# Patient Record
Sex: MALE | Race: WHITE | Employment: FULL TIME | URBAN - METROPOLITAN AREA
[De-identification: names, ages, dates, MRNs, and addresses within clinical notes are randomized per-mention and may not be internally consistent; named-entity substitution may affect disease eponyms.]

---

## 2017-05-27 ENCOUNTER — OFFICE VISIT (OUTPATIENT)
Dept: FAMILY MEDICINE CLINIC | Age: 27
End: 2017-05-27

## 2017-05-27 VITALS
DIASTOLIC BLOOD PRESSURE: 82 MMHG | HEART RATE: 84 BPM | HEIGHT: 73 IN | RESPIRATION RATE: 14 BRPM | WEIGHT: 212.6 LBS | SYSTOLIC BLOOD PRESSURE: 128 MMHG | TEMPERATURE: 97.5 F | BODY MASS INDEX: 28.18 KG/M2

## 2017-05-27 DIAGNOSIS — K21.9 GASTROESOPHAGEAL REFLUX DISEASE WITHOUT ESOPHAGITIS: ICD-10-CM

## 2017-05-27 DIAGNOSIS — L72.3 SEBACEOUS CYST: Primary | ICD-10-CM

## 2017-05-27 PROCEDURE — G8419 CALC BMI OUT NRM PARAM NOF/U: HCPCS | Performed by: FAMILY MEDICINE

## 2017-05-27 PROCEDURE — G8427 DOCREV CUR MEDS BY ELIG CLIN: HCPCS | Performed by: FAMILY MEDICINE

## 2017-05-27 PROCEDURE — 1036F TOBACCO NON-USER: CPT | Performed by: FAMILY MEDICINE

## 2017-05-27 PROCEDURE — 99213 OFFICE O/P EST LOW 20 MIN: CPT | Performed by: FAMILY MEDICINE

## 2017-05-27 RX ORDER — CEPHALEXIN 500 MG/1
500 CAPSULE ORAL 3 TIMES DAILY
Qty: 30 CAPSULE | Refills: 1 | Status: SHIPPED | OUTPATIENT
Start: 2017-05-27 | End: 2017-06-06

## 2017-05-27 RX ORDER — RANITIDINE 150 MG/1
150 TABLET ORAL 2 TIMES DAILY
Qty: 60 TABLET | Refills: 3 | Status: SHIPPED | OUTPATIENT
Start: 2017-05-27

## 2023-05-11 ENCOUNTER — APPOINTMENT (OUTPATIENT)
Dept: PRIMARY CARE | Facility: CLINIC | Age: 33
End: 2023-05-11
Payer: COMMERCIAL

## 2023-05-15 ENCOUNTER — APPOINTMENT (OUTPATIENT)
Dept: PRIMARY CARE | Facility: CLINIC | Age: 33
End: 2023-05-15
Payer: COMMERCIAL

## 2023-06-01 ENCOUNTER — APPOINTMENT (OUTPATIENT)
Dept: PRIMARY CARE | Facility: CLINIC | Age: 33
End: 2023-06-01
Payer: COMMERCIAL

## 2023-06-09 ENCOUNTER — APPOINTMENT (OUTPATIENT)
Dept: PRIMARY CARE | Facility: CLINIC | Age: 33
End: 2023-06-09
Payer: COMMERCIAL

## 2023-08-29 ENCOUNTER — HOSPITAL ENCOUNTER (OUTPATIENT)
Dept: DATA CONVERSION | Facility: HOSPITAL | Age: 33
End: 2023-08-29
Attending: UROLOGY | Admitting: UROLOGY
Payer: COMMERCIAL

## 2023-08-29 DIAGNOSIS — N20.2 CALCULUS OF KIDNEY WITH CALCULUS OF URETER: ICD-10-CM

## 2023-08-29 DIAGNOSIS — N35.912 UNSPECIFIED BULBOUS URETHRAL STRICTURE, MALE: ICD-10-CM

## 2023-08-29 LAB
CALCULI COMPOSITION: NORMAL
CALCULI DESCRIPTION: NORMAL
CALCULI MASS: NORMAL
CALCULI NUMBER-DATA CONVERSION: NORMAL
CALCULI SIZE-DATA CONVERSION: NORMAL

## 2023-09-01 ENCOUNTER — APPOINTMENT (OUTPATIENT)
Dept: PRIMARY CARE | Facility: CLINIC | Age: 33
End: 2023-09-01
Payer: COMMERCIAL

## 2023-09-02 LAB
CALCULI COMPOSITION: NORMAL
CALCULI DESCRIPTION: NORMAL
CALCULI MASS: 16 MG

## 2023-09-05 LAB
COMPLETE PATHOLOGY REPORT: NORMAL
CONVERTED CLINICAL DIAGNOSIS-HISTORY: NORMAL
CONVERTED FINAL DIAGNOSIS: NORMAL
CONVERTED FINAL REPORT PDF LINK TO COPY AND PASTE: NORMAL
CONVERTED GROSS DESCRIPTION: NORMAL
CONVERTED PHYSICIAN NOTIFICATION: NORMAL

## 2023-09-26 PROBLEM — M47.816 LUMBAR SPONDYLOSIS: Status: ACTIVE | Noted: 2023-09-26

## 2023-09-26 PROBLEM — N20.0 RENAL CALCULI: Status: ACTIVE | Noted: 2023-09-26

## 2023-09-26 PROBLEM — M51.26 LUMBAR DISC HERNIATION: Status: ACTIVE | Noted: 2023-09-26

## 2023-09-29 VITALS — BODY MASS INDEX: 28.55 KG/M2 | HEIGHT: 73 IN | WEIGHT: 215.39 LBS

## 2023-09-30 NOTE — H&P
History & Physical Reviewed:   I have reviewed the History and Physical dated:  29-Aug-2023   History and Physical reviewed and relevant findings noted. Patient examined to review pertinent physical  findings.: No significant changes   Home Medications Reviewed: no changes noted   Allergies Reviewed: no changes noted       ERAS (Enhanced Recovery After Surgery):  ·  ERAS Patient: no     Consent:   COVID-19 Consent:  ·  COVID-19 Risk Consent Surgeon has reviewed key risks related to the risk of ruth COVID-19 and if they contract COVID-19 what the risks are.     Attestation:   Note Completion:  I am a:  Resident/Fellow   Attending Attestation I saw and evaluated the patient.  I personally obtained the key and critical portions of the history and physical exam or was physically present for key and  critical portions performed by the resident/fellow. I reviewed the resident/fellow?s documentation and discussed the patient with the resident/fellow.  I agree with the resident/fellow?s medical decision making as documented in the note.     I personally evaluated the patient on 29-Aug-2023         Electronic Signatures:  Jenn Barroso (Resident))  (Signed 29-Aug-2023 07:01)   Authored: History & Physical Reviewed, ERAS, Consent,  Note Completion  Yusuf Welch)  (Signed 29-Aug-2023 07:43)   Authored: Note Completion   Co-Signer: History & Physical Reviewed, ERAS, Consent, Note Completion      Last Updated: 29-Aug-2023 07:43 by Yusuf Welch)

## 2023-10-01 NOTE — OP NOTE
Post Operative Note:     PreOp Diagnosis: right ureteral and kidney stones   Post-Procedure Diagnosis: same   Procedure: 1. cystoscopy  2. right retrograde pyelogram  3. right ureteroscopy, stone basketing  4. right 6x26 JJ ureteral stent insertion (on a string)   Surgeon: Yuri   Resident/Fellow/Other Assistant: Dharmesh Quiñones   Anesthesia: general   Estimated Blood Loss (mL): 1cc   Specimen: yes. stones   Complications: none   Findings: annular narrowing of the bulbar urethra  (easily traversed by scope), 3 renal stones (~2mm ea, fully treated)   Patient Returned To/Condition: PACU/Satisfactory   Urine Output: n/a   Drains and/or Catheters: right 6x26 JJ ureteral stent  (on a string)     Operative Report Dictated:  Dictation: not applicable - note contains Operative  Report   Operative Report:    Operative indications: Patient has a history of recurrent nephrolithiasis, prior ESWL x2 was found to have persistent nephrolithiasis on the CT scan 8/7/2023.  CT  revealed right distal ureteral stones x2 as well as right lower pole stones x2-3.  In light of this, it was recommended that he proceed with ureteroscopy and laser lithotripsy.  Signed consent was obtained.    Operative findings:  -Patient had cleared his right ureteral stones.  3 stones identified in the mid and lower poles, all basket extracted, each approximately 4 mm in size.  Stone free at the conclusion.    Operative course: Patient consented to procedure in preoperative area. Risks and benefits discussed. Patient was marked on the right side. Allergies were reviewed and preoperative antibiotics were administered. Patient was brought to the operating room  and placed in supine position on the operating room table. A timeout was performed. All were in agreement. Patient underwent general anesthesia without complication. They were repositioned in dorsal lithotomy and prepped and draped in the usual sterile  fashion. A 21 fr rigid cystoscope was used  to perform cystourethroscopy. Annular narrowing was noted in the bulbar urethra but was easily traversed by scope. Urethra was otherwise normal. Bilateral UOs were in normal orthotopic position. No masses or  stones were identified.  Through the right UO, a sensor wire was placed through a 5Fr catheter to the level of the kidney as confirmed on fluoroscopy. Retrograde pyelogram was performed.     Retrograde pyelogram interpretation: Ureter had normal caliber and course. No obvious filling defects were noted    A 4.5/6Fr Moore semirigid ureteroscope was then advanced alongside the wire to assess the ureter. No stones were noted.    A second wire was advanced to the kidney through the semi-rigid scope before scope was removed. One wire was secured as a safety wire. A 11/13Fr x 46cm ureteral access sheath was advanced over the second wire under fluoro. Wire and inner lumen were removed.  Pan pyeloscopy was performed.  Nephrolithiasis was noted in the upper pole calyces with 3 very small (2mm ea) seen and removed entirely using the ureteroscopic basket. Pyeloscopy was repeated to confirm no large stone fragments remained. Ureteral access  sheath was withdrawn under direct visualization. No ureteral stones were noted on ureteroscopy. A 6x 26JJ stent on a string was placed over the safety wire with proximal curl noted in kidney on fluoro and distal curl in the bladder on direct visualization.  Bladder was drained, instruments removed, string was secured to the phallus with mastisol and tegaderm. This concluded the procedure. Patient was awoken from anesthesia without complication and transferred to PACU in stable condition.      Attestation:   Note Completion:  I am a: Resident/Fellow   Attending Attestation I was present for key portions of the procedure and the procedure lasted longer than 5 minutes.          Electronic Signatures:  Jenn Barroso (MD (Resident))  (Signed 29-Aug-2023 09:04)   Authored: Post Operative  Note, Note Completion  Yusuf Welch)  (Signed 29-Aug-2023 09:27)   Authored: Post Operative Note, Note Completion   Co-Signer: Post Operative Note, Note Completion      Last Updated: 29-Aug-2023 09:27 by Yusuf Welch)

## 2023-11-20 ENCOUNTER — APPOINTMENT (OUTPATIENT)
Dept: UROLOGY | Facility: CLINIC | Age: 33
End: 2023-11-20
Payer: COMMERCIAL

## 2023-11-27 ENCOUNTER — HOSPITAL ENCOUNTER (EMERGENCY)
Facility: HOSPITAL | Age: 33
Discharge: HOME | End: 2023-11-27
Attending: STUDENT IN AN ORGANIZED HEALTH CARE EDUCATION/TRAINING PROGRAM
Payer: COMMERCIAL

## 2023-11-27 ENCOUNTER — APPOINTMENT (OUTPATIENT)
Dept: RADIOLOGY | Facility: HOSPITAL | Age: 33
End: 2023-11-27
Payer: COMMERCIAL

## 2023-11-27 VITALS
DIASTOLIC BLOOD PRESSURE: 108 MMHG | WEIGHT: 205 LBS | HEIGHT: 72 IN | SYSTOLIC BLOOD PRESSURE: 140 MMHG | BODY MASS INDEX: 27.77 KG/M2 | RESPIRATION RATE: 18 BRPM | HEART RATE: 100 BPM | TEMPERATURE: 97.7 F | OXYGEN SATURATION: 100 %

## 2023-11-27 DIAGNOSIS — J06.9 VIRAL UPPER RESPIRATORY TRACT INFECTION: Primary | ICD-10-CM

## 2023-11-27 LAB
FLUAV RNA RESP QL NAA+PROBE: NOT DETECTED
FLUBV RNA RESP QL NAA+PROBE: NOT DETECTED
GLUCOSE BLD MANUAL STRIP-MCNC: 100 MG/DL (ref 74–99)
RSV RNA RESP QL NAA+PROBE: NOT DETECTED
SARS-COV-2 RNA RESP QL NAA+PROBE: NOT DETECTED

## 2023-11-27 PROCEDURE — 71046 X-RAY EXAM CHEST 2 VIEWS: CPT | Mod: FY,FR

## 2023-11-27 PROCEDURE — 87634 RSV DNA/RNA AMP PROBE: CPT | Performed by: EMERGENCY MEDICINE

## 2023-11-27 PROCEDURE — 71046 X-RAY EXAM CHEST 2 VIEWS: CPT | Mod: COMPUTED RADIOGRAPHY X-RAY | Performed by: RADIOLOGY

## 2023-11-27 PROCEDURE — 82947 ASSAY GLUCOSE BLOOD QUANT: CPT

## 2023-11-27 PROCEDURE — 87636 SARSCOV2 & INF A&B AMP PRB: CPT | Performed by: STUDENT IN AN ORGANIZED HEALTH CARE EDUCATION/TRAINING PROGRAM

## 2023-11-27 PROCEDURE — 99283 EMERGENCY DEPT VISIT LOW MDM: CPT | Mod: 25 | Performed by: STUDENT IN AN ORGANIZED HEALTH CARE EDUCATION/TRAINING PROGRAM

## 2023-11-27 PROCEDURE — 94760 N-INVAS EAR/PLS OXIMETRY 1: CPT

## 2023-11-27 PROCEDURE — 99284 EMERGENCY DEPT VISIT MOD MDM: CPT | Performed by: STUDENT IN AN ORGANIZED HEALTH CARE EDUCATION/TRAINING PROGRAM

## 2023-11-27 ASSESSMENT — ENCOUNTER SYMPTOMS
NUMBNESS: 0
CONSTIPATION: 0
FEVER: 1
NECK PAIN: 0
BACK PAIN: 0
ABDOMINAL DISTENTION: 0
HEMATURIA: 0
FREQUENCY: 0
SHORTNESS OF BREATH: 0
HEADACHES: 0
PALPITATIONS: 0
CHEST TIGHTNESS: 0
COUGH: 1
FATIGUE: 1
RHINORRHEA: 1
VOMITING: 0
CHILLS: 1
DIAPHORESIS: 1
FLANK PAIN: 0
SORE THROAT: 1
DIFFICULTY URINATING: 0
ABDOMINAL PAIN: 0
DYSURIA: 0
BLOOD IN STOOL: 0
WOUND: 0
APPETITE CHANGE: 1
DIARRHEA: 0
NAUSEA: 1

## 2023-11-27 ASSESSMENT — LIFESTYLE VARIABLES
HAVE PEOPLE ANNOYED YOU BY CRITICIZING YOUR DRINKING: NO
REASON UNABLE TO ASSESS: NO
EVER HAD A DRINK FIRST THING IN THE MORNING TO STEADY YOUR NERVES TO GET RID OF A HANGOVER: NO
HAVE YOU EVER FELT YOU SHOULD CUT DOWN ON YOUR DRINKING: NO
EVER FELT BAD OR GUILTY ABOUT YOUR DRINKING: NO

## 2023-11-27 ASSESSMENT — COLUMBIA-SUICIDE SEVERITY RATING SCALE - C-SSRS
2. HAVE YOU ACTUALLY HAD ANY THOUGHTS OF KILLING YOURSELF?: NO
6. HAVE YOU EVER DONE ANYTHING, STARTED TO DO ANYTHING, OR PREPARED TO DO ANYTHING TO END YOUR LIFE?: NO
1. IN THE PAST MONTH, HAVE YOU WISHED YOU WERE DEAD OR WISHED YOU COULD GO TO SLEEP AND NOT WAKE UP?: NO

## 2023-11-27 ASSESSMENT — PAIN - FUNCTIONAL ASSESSMENT: PAIN_FUNCTIONAL_ASSESSMENT: 0-10

## 2023-11-27 ASSESSMENT — PAIN SCALES - GENERAL: PAINLEVEL_OUTOF10: 0 - NO PAIN

## 2023-11-27 NOTE — DISCHARGE INSTRUCTIONS
Please return to the ER or seek immediate medical attention if you experience new or worsening chest pain, shortness of breath, fever of 38C (100.4) or higher, persistent vomiting, weakness, numbness, tingling, excessive sweating,  loss of motion in your arms or legs, fainting, vision changes, or any new or worsening symptoms.    You are welcome back any time. Thank you for entrusting your care to us, I hope we made your visit as pleasant as possible. Wishing you well!    Dr. Calhoun

## 2023-11-27 NOTE — ED PROVIDER NOTES
EMERGENCY DEPARTMENT ENCOUNTER      Pt Name: Scott Harry  MRN: [id number]  Birthdate 1990  Date of evaluation: 11/27/2023  Provider: Von Calhoun DO    HPI   CC:  Chief Complaint   Patient presents with    URI       HPI  Scott Harry is a 33 y.o. year old male who presents to the ER for fever.  He states that his symptoms started Tuesday with malaise, Thursday noted temperature of 101 measured by forehead, since then has been using acetaminophen and ibuprofen to manage fever.  He states that medications are working however every 6 hours fever returns when meds wear off.  Associated symptoms include chills, diaphoresis, loss of appetite, congestion, rhinorrhea, sore throat, cough, nausea without vomiting.  Denies chest pain, shortness of breath, abdominal pain, changes to bowel or bladder habits.  He states symptoms have been improving over the past few days, he is primarily concerned regarding duration of symptoms.      Review of Systems   Review of Systems   Constitutional:  Positive for appetite change, chills, diaphoresis, fatigue and fever.   HENT:  Positive for congestion, rhinorrhea and sore throat. Negative for tinnitus.    Eyes:  Negative for visual disturbance.   Respiratory:  Positive for cough. Negative for chest tightness and shortness of breath.    Cardiovascular:  Negative for chest pain and palpitations.   Gastrointestinal:  Positive for nausea. Negative for abdominal distention, abdominal pain, blood in stool (hematochezia or melena), constipation, diarrhea and vomiting.   Endocrine: Negative for polyuria.   Genitourinary:  Negative for decreased urine volume, difficulty urinating, dysuria, flank pain, frequency, hematuria and urgency.   Musculoskeletal:  Negative for back pain and neck pain.   Skin:  Negative for rash and wound.   Neurological:  Negative for numbness and headaches.       Patient History     PAST MEDICAL HISTORY     Past Medical History:   Diagnosis Date    Other  conditions influencing health status     No significant past medical history       SURGICAL HISTORY       Past Surgical History:   Procedure Laterality Date    OTHER SURGICAL HISTORY  01/28/2020    [place] tooth extraction       ALLERGIES     Patient has no known allergies.  No Known Allergies    FAMILY HISTORY     No family history on file.     SOCIAL HISTORY       Social History     Tobacco Use    Smoking status: Not on file    Smokeless tobacco: Not on file   Substance Use Topics    Alcohol use: Not on file    Drug use: Not on file       PHYSICAL EXAM   Physical Exam   ED Triage Vitals [11/27/23 0948]   Temp Heart Rate Resp BP   36.5 °C (97.7 °F) 98 18 155/90      SpO2 Temp Source Heart Rate Source Patient Position   100 % Temporal Monitor Sitting      BP Location FiO2 (%)     Right arm --       Physical Exam  Vitals and nursing note reviewed.   Constitutional:       General: He is not in acute distress.     Appearance: Normal appearance. He is not ill-appearing.   HENT:      Head: Atraumatic. No contusion or laceration.      Jaw: No trismus or malocclusion.      Right Ear: External ear normal.      Left Ear: External ear normal.      Nose: Congestion present.      Comments: No sinus TTP     Mouth/Throat:      Mouth: Mucous membranes are moist.      Pharynx: Oropharynx is clear.   Eyes:      Extraocular Movements: Extraocular movements intact.      Pupils: Pupils are equal, round, and reactive to light.   Neck:      Trachea: No tracheal deviation.   Cardiovascular:      Rate and Rhythm: Normal rate and regular rhythm.      Pulses: Normal pulses.   Pulmonary:      Effort: Pulmonary effort is normal. No respiratory distress.      Breath sounds: Normal breath sounds. No wheezing, rhonchi or rales.   Chest:      Chest wall: No tenderness.   Abdominal:      General: Abdomen is flat. There is no distension.      Palpations: Abdomen is soft. There is no mass.      Tenderness: There is no abdominal tenderness. There is no  right CVA tenderness or left CVA tenderness.   Musculoskeletal:         General: No tenderness or signs of injury.      Cervical back: Normal range of motion. No tenderness.      Right lower leg: No edema.      Left lower leg: No edema.   Skin:     Coloration: Skin is not jaundiced or pale.      Findings: No rash or wound.   Neurological:      General: No focal deficit present.      Mental Status: He is alert. Mental status is at baseline.   Psychiatric:         Speech: Speech normal.         Behavior: Behavior normal.         Thought Content: Thought content does not include homicidal or suicidal ideation.         Judgment: Judgment normal.         DIAGNOSTIC RESULTS   RADIOLOGY:   Non-plain film images such as CT, Ultrasound and MRI are read by the radiologist. Plain radiographic images are visualized and preliminarily interpreted by the emergency physician with the below findings:    Interpretation of chest x-ray no acute cardiopulmonary pathology visualized.     Interpretation per the Radiologist below, if available at the time of this note:    XR chest 2 views   Final Result   No acute thoracic findings.   Signed by Charly Mao II, MD            ED BEDSIDE ULTRASOUND:   Performed by ED Physician - none    LABS:  Labs Reviewed   POCT GLUCOSE - Abnormal       Result Value    POCT Glucose 100 (*)    SARS-COV-2 PCR, SYMPTOMATIC - Normal    Coronavirus 2019, PCR Not Detected      Narrative:     This assay has received FDA Emergency Use Authorization (EUA) and is only authorized for the duration of time that circumstances exist to justify the authorization of the emergency use of in vitro diagnostic tests for the detection of SARS-CoV-2 virus and/or diagnosis of COVID-19 infection under section 564(b)(1) of the Act, 21 U.S.C. 360bbb-3(b)(1). This assay is an in vitro diagnostic nucleic acid amplification test for the qualitative detection of SARS-CoV-2 from nasopharyngeal specimens and has been validated for use at  [facility]. Negative results do not preclude COVID-19 infections and should not be used as the sole basis for diagnosis, treatment, or other management decisions.     INFLUENZA A AND B PCR - Normal    Flu A Result Not Detected      Flu B Result Not Detected      Narrative:     This assay is an in vitro diagnostic multiplex nucleic acid amplification test for the detection and discrimination of Influenza A & B from nasopharyngeal specimens, and has been validated for use at [facility]. Negative results do not preclude Influenza A/B infections, and should not be used as the sole basis for diagnosis, treatment, or other management decisions. If Influenza A/B and RSV PCR results are negative, testing for Parainfluenza virus, Adenovirus and Metapneumovirus is routinely performed for [facility] pediatric oncology and intensive care inpatients, and is available on other patients by placing an add-on request.   RSV PCR - Normal    RSV PCR Not Detected      Narrative:     This assay is an FDA-cleared, in vitro diagnostic nucleic acid amplification test for the detection of RSV from nasopharyngeal specimens, and has been validated for use at [facility]. Negative results do not preclude RSV infections, and should not be used as the sole basis for diagnosis, treatment, or other management decisions. If Influenza A/B and RSV PCR results are negative, testing for Parainfluenza virus, Adenovirus and Metapneumovirus is routinely performed for pediatric oncology and intensive care inpatients at [facility], and is available on other patients by placing an add-on request.       POCT GLUCOSE METER       All other labs were within normal range or not returned as of this dictation.      EMERGENCY DEPARTMENT COURSE/MDM:   ED Course & MDM   ED Course as of 11/27/23 1424   Mon Nov 27, 2023   1144 Doesn't have PCP, will require one at discharge. [CB]   1325 POCT Glucose(!): 100  No  hypoglycemia. Viral swabs negative.  [CB]      ED Course User Index  [CB] Von Calhoun DO         Diagnoses as of 11/27/23 1424   Viral upper respiratory tract infection     Vitals:    Vitals:    11/27/23 0948 11/27/23 1341   BP: 155/90 (!) 140/108   BP Location: Right arm Left arm   Patient Position: Sitting Sitting   Pulse: 98 100   Resp: 18 18   Temp: 36.5 °C (97.7 °F)    TempSrc: Temporal    SpO2: 100% 100%   Weight: 93 kg (205 lb)    Height: 1.829 m (6')      Scott Harry is a male 33 y.o. who presents to the ER for   Chief Complaint   Patient presents with    URI   . On arrival the patients vital signs were: Afebrile, Reguar HR, Normotensive, Regular RR, and Oxygenating well on room air. History obtained from: patient.  33-year-old male presents to the ED with nearly 1 week of fever.  After shared decision making conservative workup initiated, limited to viral swabs, POC glucose, and 2 view CXR. No interventions provided. Viral swabs negative for covid/flu/RSV. Glucose WNL, no hypo or hyerglycemia. On CXR no acute cardiopulmonary pathology noted.    Diagnostic testing considered but not performed: Blood and urine labs including CBC, CMP, UA to assess for hepatorenal/electrolyte insufficiency or other signs of disseminated infection.  Social Determinants Affecting Care: Pt requested new PCP, contact information provided.    Shared decision making for disposition  Patient and/or patient´s representative was counseled regarding labs, imaging, likely diagnosis. All questions were answered. Recommendation was made   for discharge home. The patient agreed and was discharged home in stable condition with appropriate relevant educational materials. Return precautions were provided which included new or worsening chest pain, shortness of breath, fever of 38C (100.4) or higher, persistent vomiting, weakness, numbness, tingling, excessive sweating,  loss of motion in your arms or legs, fainting, vision changes, or  any new or worsening symptoms..       ED Medications administered this visit:  Medications - No data to display    New Prescriptions from this visit:    There are no discharge medications for this patient.      Follow-up:  Mari Aviles MD  [address]  [address]  [place] [address] [phone]    Schedule an appointment as soon as possible for a visit       Torrey Darby DO  [address] [address] [phone]    Schedule an appointment as soon as possible for a visit           Final Impression:   1. Viral upper respiratory tract infection          I reviewed the case with the attending ED physician. The attending ED physician agrees with the plan.   Von Calhoun DO  PGY-2  Emergency Medicine      Please excuse any misspellings or unintended errors related to the Dragon speech recognition software used to dictate this note.       Von Calhoun DO  Resident  11/27/23 1424       Von Calhoun DO  Resident  11/27/23 142

## 2024-07-13 ENCOUNTER — APPOINTMENT (OUTPATIENT)
Dept: CARDIOLOGY | Facility: HOSPITAL | Age: 34
End: 2024-07-13
Payer: COMMERCIAL

## 2024-07-13 ENCOUNTER — HOSPITAL ENCOUNTER (EMERGENCY)
Facility: HOSPITAL | Age: 34
Discharge: HOME | End: 2024-07-13
Attending: EMERGENCY MEDICINE
Payer: COMMERCIAL

## 2024-07-13 ENCOUNTER — APPOINTMENT (OUTPATIENT)
Dept: RADIOLOGY | Facility: HOSPITAL | Age: 34
End: 2024-07-13
Payer: COMMERCIAL

## 2024-07-13 VITALS
OXYGEN SATURATION: 98 % | TEMPERATURE: 97.9 F | WEIGHT: 190 LBS | BODY MASS INDEX: 25.73 KG/M2 | HEART RATE: 84 BPM | HEIGHT: 72 IN | DIASTOLIC BLOOD PRESSURE: 86 MMHG | SYSTOLIC BLOOD PRESSURE: 149 MMHG | RESPIRATION RATE: 16 BRPM

## 2024-07-13 DIAGNOSIS — R55 SYNCOPE, UNSPECIFIED SYNCOPE TYPE: Primary | ICD-10-CM

## 2024-07-13 LAB
ALBUMIN SERPL BCP-MCNC: 4.4 G/DL (ref 3.4–5)
ALP SERPL-CCNC: 44 U/L (ref 33–120)
ALT SERPL W P-5'-P-CCNC: 15 U/L (ref 10–52)
AMPHETAMINES UR QL SCN: ABNORMAL
ANION GAP SERPL CALC-SCNC: 13 MMOL/L (ref 10–20)
AST SERPL W P-5'-P-CCNC: 14 U/L (ref 9–39)
BARBITURATES UR QL SCN: ABNORMAL
BASOPHILS # BLD AUTO: 0.1 X10*3/UL (ref 0–0.1)
BASOPHILS NFR BLD AUTO: 0.8 %
BENZODIAZ UR QL SCN: ABNORMAL
BILIRUB SERPL-MCNC: 0.5 MG/DL (ref 0–1.2)
BUN SERPL-MCNC: 19 MG/DL (ref 6–23)
BZE UR QL SCN: ABNORMAL
CALCIUM SERPL-MCNC: 9 MG/DL (ref 8.6–10.3)
CANNABINOIDS UR QL SCN: ABNORMAL
CARDIAC TROPONIN I PNL SERPL HS: 3 NG/L (ref 0–20)
CHLORIDE SERPL-SCNC: 103 MMOL/L (ref 98–107)
CO2 SERPL-SCNC: 23 MMOL/L (ref 21–32)
CREAT SERPL-MCNC: 1.13 MG/DL (ref 0.5–1.3)
EGFRCR SERPLBLD CKD-EPI 2021: 87 ML/MIN/1.73M*2
EOSINOPHIL # BLD AUTO: 0.34 X10*3/UL (ref 0–0.7)
EOSINOPHIL NFR BLD AUTO: 2.6 %
ERYTHROCYTE [DISTWIDTH] IN BLOOD BY AUTOMATED COUNT: 12.5 % (ref 11.5–14.5)
ETHANOL SERPL-MCNC: <10 MG/DL
FENTANYL+NORFENTANYL UR QL SCN: ABNORMAL
GLUCOSE SERPL-MCNC: 133 MG/DL (ref 74–99)
HCT VFR BLD AUTO: 43.8 % (ref 41–52)
HGB BLD-MCNC: 14.9 G/DL (ref 13.5–17.5)
IMM GRANULOCYTES # BLD AUTO: 0.07 X10*3/UL (ref 0–0.7)
IMM GRANULOCYTES NFR BLD AUTO: 0.5 % (ref 0–0.9)
INR PPP: 1.2 (ref 0.9–1.1)
LYMPHOCYTES # BLD AUTO: 3.79 X10*3/UL (ref 1.2–4.8)
LYMPHOCYTES NFR BLD AUTO: 29.4 %
MCH RBC QN AUTO: 30 PG (ref 26–34)
MCHC RBC AUTO-ENTMCNC: 34 G/DL (ref 32–36)
MCV RBC AUTO: 88 FL (ref 80–100)
METHADONE UR QL SCN: ABNORMAL
MONOCYTES # BLD AUTO: 1.08 X10*3/UL (ref 0.1–1)
MONOCYTES NFR BLD AUTO: 8.4 %
NEUTROPHILS # BLD AUTO: 7.49 X10*3/UL (ref 1.2–7.7)
NEUTROPHILS NFR BLD AUTO: 58.3 %
NRBC BLD-RTO: 0 /100 WBCS (ref 0–0)
OPIATES UR QL SCN: ABNORMAL
OXYCODONE+OXYMORPHONE UR QL SCN: ABNORMAL
PCP UR QL SCN: ABNORMAL
PLATELET # BLD AUTO: 299 X10*3/UL (ref 150–450)
POTASSIUM SERPL-SCNC: 3.3 MMOL/L (ref 3.5–5.3)
PROT SERPL-MCNC: 7.4 G/DL (ref 6.4–8.2)
PROTHROMBIN TIME: 13.9 SECONDS (ref 9.8–12.8)
RBC # BLD AUTO: 4.96 X10*6/UL (ref 4.5–5.9)
SODIUM SERPL-SCNC: 136 MMOL/L (ref 136–145)
WBC # BLD AUTO: 12.9 X10*3/UL (ref 4.4–11.3)

## 2024-07-13 PROCEDURE — 93010 ELECTROCARDIOGRAM REPORT: CPT | Performed by: EMERGENCY MEDICINE

## 2024-07-13 PROCEDURE — 82077 ASSAY SPEC XCP UR&BREATH IA: CPT | Performed by: EMERGENCY MEDICINE

## 2024-07-13 PROCEDURE — 99285 EMERGENCY DEPT VISIT HI MDM: CPT | Performed by: EMERGENCY MEDICINE

## 2024-07-13 PROCEDURE — 36415 COLL VENOUS BLD VENIPUNCTURE: CPT | Performed by: STUDENT IN AN ORGANIZED HEALTH CARE EDUCATION/TRAINING PROGRAM

## 2024-07-13 PROCEDURE — 93005 ELECTROCARDIOGRAM TRACING: CPT

## 2024-07-13 PROCEDURE — 84484 ASSAY OF TROPONIN QUANT: CPT | Performed by: EMERGENCY MEDICINE

## 2024-07-13 PROCEDURE — 71045 X-RAY EXAM CHEST 1 VIEW: CPT | Performed by: STUDENT IN AN ORGANIZED HEALTH CARE EDUCATION/TRAINING PROGRAM

## 2024-07-13 PROCEDURE — 36415 COLL VENOUS BLD VENIPUNCTURE: CPT | Performed by: EMERGENCY MEDICINE

## 2024-07-13 PROCEDURE — 99283 EMERGENCY DEPT VISIT LOW MDM: CPT | Mod: 25

## 2024-07-13 PROCEDURE — 85610 PROTHROMBIN TIME: CPT | Performed by: EMERGENCY MEDICINE

## 2024-07-13 PROCEDURE — 85025 COMPLETE CBC W/AUTO DIFF WBC: CPT | Performed by: EMERGENCY MEDICINE

## 2024-07-13 PROCEDURE — 80307 DRUG TEST PRSMV CHEM ANLYZR: CPT | Performed by: EMERGENCY MEDICINE

## 2024-07-13 PROCEDURE — 71045 X-RAY EXAM CHEST 1 VIEW: CPT

## 2024-07-13 PROCEDURE — 80053 COMPREHEN METABOLIC PANEL: CPT | Performed by: EMERGENCY MEDICINE

## 2024-07-13 PROCEDURE — 85025 COMPLETE CBC W/AUTO DIFF WBC: CPT | Performed by: STUDENT IN AN ORGANIZED HEALTH CARE EDUCATION/TRAINING PROGRAM

## 2024-07-13 PROCEDURE — 2500000004 HC RX 250 GENERAL PHARMACY W/ HCPCS (ALT 636 FOR OP/ED): Performed by: EMERGENCY MEDICINE

## 2024-07-13 RX ADMIN — SODIUM CHLORIDE 1000 ML: 9 INJECTION, SOLUTION INTRAVENOUS at 22:41

## 2024-07-13 ASSESSMENT — COLUMBIA-SUICIDE SEVERITY RATING SCALE - C-SSRS
6. HAVE YOU EVER DONE ANYTHING, STARTED TO DO ANYTHING, OR PREPARED TO DO ANYTHING TO END YOUR LIFE?: NO
1. IN THE PAST MONTH, HAVE YOU WISHED YOU WERE DEAD OR WISHED YOU COULD GO TO SLEEP AND NOT WAKE UP?: NO
2. HAVE YOU ACTUALLY HAD ANY THOUGHTS OF KILLING YOURSELF?: NO

## 2024-07-13 ASSESSMENT — LIFESTYLE VARIABLES
TOTAL SCORE: 0
EVER FELT BAD OR GUILTY ABOUT YOUR DRINKING: NO
HAVE YOU EVER FELT YOU SHOULD CUT DOWN ON YOUR DRINKING: NO
HAVE PEOPLE ANNOYED YOU BY CRITICIZING YOUR DRINKING: NO
EVER HAD A DRINK FIRST THING IN THE MORNING TO STEADY YOUR NERVES TO GET RID OF A HANGOVER: NO

## 2024-07-13 ASSESSMENT — PAIN - FUNCTIONAL ASSESSMENT: PAIN_FUNCTIONAL_ASSESSMENT: 0-10

## 2024-07-13 ASSESSMENT — PAIN SCALES - GENERAL
PAINLEVEL_OUTOF10: 0 - NO PAIN
PAINLEVEL_OUTOF10: 0 - NO PAIN

## 2024-07-14 LAB — HOLD SPECIMEN: NORMAL

## 2024-07-14 NOTE — DISCHARGE INSTRUCTIONS
Hydrate well over next few days.  Any further episodes of fainting or even close to fainting or any seizure activity should prompt immediate return to the ER.

## 2024-07-14 NOTE — ED PROVIDER NOTES
HPI   Chief Complaint   Patient presents with    Syncope       33-year-old male presents with wife after 2 episodes of syncope while sitting down.  He has been ill for the last 4 to 5 days with nausea and vomiting.  No diarrhea.  Admits his p.o. intake has been less than usual.  He had 2 beers today and limited marijuana.  While sitting down his wife noticed him twice for just brief moments each time sort of leaned forward and like he wanted to faint.  No clear seizure activity.  No biting of his tongue or bowel bladder problems.  He was diagnosed with 1 episode of seizure years ago when he was in college.  No medications were further problems have occurred.  He feels well at this time.      History provided by:  Patient and spouse                      Cristal Coma Scale Score: 15                     Patient History   Past Medical History:   Diagnosis Date    Other conditions influencing health status     No significant past medical history     Past Surgical History:   Procedure Laterality Date    OTHER SURGICAL HISTORY  01/28/2020    [place] tooth extraction     No family history on file.  Social History     Tobacco Use    Smoking status: Never    Smokeless tobacco: Never   Vaping Use    Vaping status: Never Used   Substance Use Topics    Alcohol use: Yes     Alcohol/week: 2.0 standard drinks of alcohol     Types: 2 Cans of beer per week    Drug use: Never       Physical Exam   ED Triage Vitals [07/13/24 2129]   Temperature Heart Rate Respirations BP   36.6 °C (97.9 °F) 83 16 133/86      Pulse Ox Temp Source Heart Rate Source Patient Position   97 % Temporal Monitor Lying      BP Location FiO2 (%)     Right arm --       Physical Exam  Vitals and nursing note reviewed.   Constitutional:       General: He is not in acute distress.     Appearance: Normal appearance. He is not ill-appearing, toxic-appearing or diaphoretic.   HENT:      Head: Normocephalic and atraumatic.      Comments: No bite marks     Mouth/Throat:       Mouth: Mucous membranes are moist.   Cardiovascular:      Rate and Rhythm: Normal rate and regular rhythm.      Heart sounds: Normal heart sounds.   Pulmonary:      Effort: Pulmonary effort is normal.      Breath sounds: Normal breath sounds.   Abdominal:      Palpations: Abdomen is soft.      Tenderness: There is no abdominal tenderness. There is no guarding or rebound.   Musculoskeletal:         General: No tenderness. Normal range of motion.      Cervical back: Normal range of motion and neck supple. No tenderness.   Skin:     General: Skin is warm and dry.   Neurological:      General: No focal deficit present.      Mental Status: He is alert and oriented to person, place, and time.      GCS: GCS eye subscore is 4. GCS verbal subscore is 5. GCS motor subscore is 6.      Cranial Nerves: Cranial nerves 2-12 are intact. No cranial nerve deficit.      Sensory: Sensation is intact. No sensory deficit.      Motor: Motor function is intact. No weakness.      Coordination: Coordination is intact. Coordination normal.      Gait: Gait normal.      Comments: Nih score zero         ED Course & MDM   Diagnoses as of 07/13/24 3473   Syncope, unspecified syncope type       Medical Decision Making  33-year-old male who had 2 brief syncopal type events while sitting down.  Has been drinking today.  Had some gastric type symptoms last 4 to 5 days.  Not hydrating well.  Looks well here.  Neurological deficits not present.  Orthostatic blood pressure good.  Vitals good.  Will be discharged home.  Patient and wife understand any further episodes or any new concerns or symptoms should prompt immediate return to receive another evaluation.    Amount and/or Complexity of Data Reviewed  Labs: ordered. Decision-making details documented in ED Course.  Radiology: independent interpretation performed.     Details: Chest x-ray without acute findings to my interpretation.  Official read pending.  ECG/medicine tests: ordered and independent  interpretation performed. Decision-making details documented in ED Course.     Details: Hr 89, pr 0.17, qrs 0.1,  qtc 0.412, axis nml, no st changes.    EKG #2, normal sinus, heart rate 84, , , QTc of 453, axis normal.  No concerning ST changes.        Procedure  Procedures     Horace Ventura MD  07/14/24 0105

## 2024-07-20 LAB
ATRIAL RATE: 84 BPM
P AXIS: 65 DEGREES
P OFFSET: 181 MS
P ONSET: 128 MS
PR INTERVAL: 174 MS
Q ONSET: 215 MS
QRS COUNT: 13 BEATS
QRS DURATION: 108 MS
QT INTERVAL: 384 MS
QTC CALCULATION(BAZETT): 453 MS
QTC FREDERICIA: 429 MS
R AXIS: 46 DEGREES
T AXIS: 62 DEGREES
T OFFSET: 407 MS
VENTRICULAR RATE: 84 BPM

## 2024-10-21 ENCOUNTER — APPOINTMENT (OUTPATIENT)
Dept: PRIMARY CARE | Facility: CLINIC | Age: 34
End: 2024-10-21
Payer: COMMERCIAL

## 2024-10-23 NOTE — PROGRESS NOTES
Subjective   Patient ID: Scott Harry is a 34 y.o. male who presents for No chief complaint on file..  HPI  Annual physical   Eats a generally healthy diet   Exercises   Denies any chest pain,SOB  No Abdominal pain   No black or bloody stools   Urination/BM normal   Last eye apt  Last dental apt     Flu     Review of systems  ; Patient seen today for exam denies any problems with headaches or vision, denies any shortness of breath chest pain nausea or vomiting, no black stool no blood in the stool no heartburn type symptoms denies any problems with constipation or diarrhea, and no dysuria-type symptoms    The patient's allergies medications were reviewed with them today    The patient's social family and surgical history or also reviewed here today, along with her past medical history.     Objective     Alert and active in  no acute distress  HEENT TMs clear oropharynx negative nares clear no drainage noted neck supple  With no adenopathy   Heart regular rate and rhythm without murmur and no carotid bruits  Lungs- clear to auscultation bilaterally, no wheeze or rhonchi noted  Thyroid -negative masses or nodularity  Abdomen- soft times four quadrants , bowel sounds positive no masses or organomegaly, negative tenderness guarding or rebound  Neurological exam unremarkable- DTRs in upper and lower extremities within normal limits.   skin -no lesions noted      There were no vitals taken for this visit.    Allergies   Allergen Reactions   • Coconut Anaphylaxis, Angioedema and Hives     coconut    anaphylactic       Assessment/Plan   Problem List Items Addressed This Visit    None  Visit Diagnoses       Lipid screening        Routine general medical examination at a health care facility                  If anything worsens or changes please call us at once, follow up in the office as planned,

## 2024-10-24 ENCOUNTER — APPOINTMENT (OUTPATIENT)
Dept: PRIMARY CARE | Facility: CLINIC | Age: 34
End: 2024-10-24
Payer: COMMERCIAL

## 2024-10-24 DIAGNOSIS — Z13.220 LIPID SCREENING: ICD-10-CM

## 2024-10-24 DIAGNOSIS — Z00.00 ROUTINE GENERAL MEDICAL EXAMINATION AT A HEALTH CARE FACILITY: ICD-10-CM

## 2024-10-30 ENCOUNTER — APPOINTMENT (OUTPATIENT)
Dept: PRIMARY CARE | Facility: CLINIC | Age: 34
End: 2024-10-30
Payer: COMMERCIAL

## 2024-11-15 ENCOUNTER — APPOINTMENT (OUTPATIENT)
Dept: PRIMARY CARE | Facility: CLINIC | Age: 34
End: 2024-11-15
Payer: COMMERCIAL

## 2024-11-15 NOTE — PROGRESS NOTES
Subjective   Patient ID: Scott Harry is a 34 y.o. male who presents for No chief complaint on file..  HPI    Annual physical   Eats/does not eat a generally healthy diet   Exercises ***  Denies any chest pain,SOB  No Abdominal pain   No black or bloody stools   Urination/BM normal   Last eye apt ***  Last dental apt ***  No new family h/o cancers or heart disease                 Review of systems  ; Patient seen today for exam denies any problems with headaches or vision, denies any shortness of breath chest pain nausea or vomiting, no black stool no blood in the stool no heartburn type symptoms denies any problems with constipation or diarrhea, and no dysuria-type symptoms    The patient's allergies medications were reviewed with them today    The patient's social family and surgical history or also reviewed here today, along with her past medical history.     Objective     Alert and active in  no acute distress  HEENT TMs clear oropharynx negative nares clear no drainage noted neck supple  With no adenopathy   Heart regular rate and rhythm without murmur and no carotid bruits  Lungs- clear to auscultation bilaterally, no wheeze or rhonchi noted  Thyroid -negative masses or nodularity  Abdomen- soft times four quadrants , bowel sounds positive no masses or organomegaly, negative tenderness guarding or rebound  Neurological exam unremarkable- DTRs in upper and lower extremities within normal limits.   skin -no lesions noted      There were no vitals taken for this visit.    Allergies   Allergen Reactions   • Coconut Anaphylaxis, Angioedema and Hives     coconut    anaphylactic       Assessment/Plan   Problem List Items Addressed This Visit    None        If anything worsens or changes please call us at once, follow up in the office as planned,

## 2024-11-20 ENCOUNTER — APPOINTMENT (OUTPATIENT)
Dept: PRIMARY CARE | Facility: CLINIC | Age: 34
End: 2024-11-20
Payer: COMMERCIAL

## 2024-11-25 ENCOUNTER — APPOINTMENT (OUTPATIENT)
Dept: RADIOLOGY | Facility: HOSPITAL | Age: 34
End: 2024-11-25
Payer: COMMERCIAL

## 2024-11-25 ENCOUNTER — HOSPITAL ENCOUNTER (EMERGENCY)
Facility: HOSPITAL | Age: 34
Discharge: HOME | End: 2024-11-25
Payer: COMMERCIAL

## 2024-11-25 VITALS
WEIGHT: 208 LBS | DIASTOLIC BLOOD PRESSURE: 79 MMHG | RESPIRATION RATE: 16 BRPM | SYSTOLIC BLOOD PRESSURE: 149 MMHG | BODY MASS INDEX: 27.57 KG/M2 | OXYGEN SATURATION: 98 % | TEMPERATURE: 97.7 F | HEIGHT: 73 IN | HEART RATE: 74 BPM

## 2024-11-25 DIAGNOSIS — S61.211A LACERATION OF LEFT INDEX FINGER WITHOUT FOREIGN BODY WITHOUT DAMAGE TO NAIL, INITIAL ENCOUNTER: Primary | ICD-10-CM

## 2024-11-25 PROCEDURE — 2500000005 HC RX 250 GENERAL PHARMACY W/O HCPCS: Performed by: PHYSICIAN ASSISTANT

## 2024-11-25 PROCEDURE — 12001 RPR S/N/AX/GEN/TRNK 2.5CM/<: CPT | Performed by: PHYSICIAN ASSISTANT

## 2024-11-25 PROCEDURE — 73130 X-RAY EXAM OF HAND: CPT | Mod: LEFT SIDE | Performed by: RADIOLOGY

## 2024-11-25 PROCEDURE — 73130 X-RAY EXAM OF HAND: CPT | Mod: LT

## 2024-11-25 PROCEDURE — 99283 EMERGENCY DEPT VISIT LOW MDM: CPT

## 2024-11-25 RX ORDER — BACITRACIN ZINC 500 UNIT/G
OINTMENT IN PACKET (EA) TOPICAL ONCE
Status: COMPLETED | OUTPATIENT
Start: 2024-11-25 | End: 2024-11-25

## 2024-11-25 ASSESSMENT — LIFESTYLE VARIABLES
EVER FELT BAD OR GUILTY ABOUT YOUR DRINKING: NO
HAVE YOU EVER FELT YOU SHOULD CUT DOWN ON YOUR DRINKING: NO
HAVE PEOPLE ANNOYED YOU BY CRITICIZING YOUR DRINKING: NO
TOTAL SCORE: 0
EVER HAD A DRINK FIRST THING IN THE MORNING TO STEADY YOUR NERVES TO GET RID OF A HANGOVER: NO

## 2024-11-25 ASSESSMENT — COLUMBIA-SUICIDE SEVERITY RATING SCALE - C-SSRS
1. IN THE PAST MONTH, HAVE YOU WISHED YOU WERE DEAD OR WISHED YOU COULD GO TO SLEEP AND NOT WAKE UP?: NO
2. HAVE YOU ACTUALLY HAD ANY THOUGHTS OF KILLING YOURSELF?: NO
6. HAVE YOU EVER DONE ANYTHING, STARTED TO DO ANYTHING, OR PREPARED TO DO ANYTHING TO END YOUR LIFE?: NO

## 2024-11-25 ASSESSMENT — PAIN SCALES - GENERAL: PAINLEVEL_OUTOF10: 0 - NO PAIN

## 2024-11-25 ASSESSMENT — PAIN - FUNCTIONAL ASSESSMENT: PAIN_FUNCTIONAL_ASSESSMENT: 0-10

## 2024-11-25 NOTE — ED PROVIDER NOTES
Emergency Department Provider Note        History of Present Illness     History provided by: Patient  Limitations to History: None  External Records Reviewed with Brief Summary: None    HPI:  Scott Harry is a 34 y.o. male presents today for evaluation of a laceration to his left index finger, patient states this occurred around 2 PM so 4 hours prior to arrival, patient is up-to-date on his tetanus.  He states he cut the finger on the metal edge of an alternator, states that it just keeps bleeding and popping open so he wanted to be evaluated.  Patient denies any numbness or tingling, he is able to feel everything, able to move the finger.  Denies any other complaints.  He does not believe there is a foreign body but states he stuck it under a magnet just to make sure.    Physical Exam   Triage vitals:  T 36.5 °C (97.7 °F)  HR 85  BP (!) 157/95  RR 16  O2 98 % None (Room air)    Physical Exam  Vitals and nursing note reviewed.   Constitutional:       General: He is not in acute distress.     Appearance: Normal appearance. He is not toxic-appearing.   HENT:      Head: Normocephalic and atraumatic.      Nose: Nose normal.   Eyes:      Extraocular Movements: Extraocular movements intact.   Cardiovascular:      Rate and Rhythm: Normal rate and regular rhythm.   Pulmonary:      Effort: Pulmonary effort is normal.   Abdominal:      Palpations: Abdomen is soft.   Musculoskeletal:         General: Normal range of motion.        Hands:       Cervical back: Normal range of motion and neck supple.      Comments: Laceration as highlighted, gaping with extension of the finger, distal sensation and cap refill is intact, not diminished, patient has full range of motion with flexion and extension at MCP PIP and DIP including against resistance.  No active bleeding.   Skin:     General: Skin is warm and dry.   Neurological:      General: No focal deficit present.      Mental Status: He is alert.   Psychiatric:         Mood  and Affect: Mood normal.         Thought Content: Thought content normal.        XR hand left 3+ views   Final Result   No acute fracture or malalignment.  No radiopaque foreign body.   Signed by Joseph Bolaños,         Labs Reviewed - No data to display  Diagnoses as of 24   Laceration of left index finger without foreign body without damage to nail, initial encounter         Medical Decision Making & ED Course   Medical Decision Makin y.o. male presents today for evaluation of a left index finger injury, I did x-ray to rule out foreign body, his tetanus is up-to-date within the last 5 years, area was cleansed with Betadine, extensively irrigated under pressure with tap water after numbing with lidocaine.  Given that it is gaping at a high tension area I did closed with sutures, see procedure note. Infection return precautions as well as instructions to return in 1 week for suture removal were discussed.  ----      Differential diagnoses considered include but are not limited to: laceration, infection, tendon injury     Social Determinants of Health which Significantly Impact Care: None identified     EKG Independent Interpretation: EKG not obtained    Independent Result Review and Interpretation: Relevant laboratory and radiographic results were reviewed and independently interpreted by myself.  As necessary, they are commented on in the ED Course.    Chronic conditions affecting the patient's care: As documented above in [facility]    The patient was discussed with the following consultants/services: None    Care Considerations: As documented above in [facility]    ED Course:  Diagnoses as of 24   Laceration of left index finger without foreign body without damage to nail, initial encounter     Disposition   As a result of the work-up, the patient was discharged home.  he was informed of his diagnosis and instructed to come back with any concerns or worsening of condition.  he and was agreeable  to the plan as discussed above.  he was given the opportunity to ask questions.  All of the patient's questions were answered.    Procedures   Laceration Repair    Performed by: Anjana Cabrera PA-C  Authorized by: Anjana Cabrera PA-C    Consent:     Consent obtained:  Verbal    Consent given by:  Patient    Risks, benefits, and alternatives were discussed: yes      Risks discussed:  Infection, need for additional repair, nerve damage, poor wound healing, poor cosmetic result and retained foreign body    Alternatives discussed:  No treatment  Universal protocol:     Procedure explained and questions answered to patient or proxy's satisfaction: yes      Imaging studies available: yes      Patient identity confirmed:  Verbally with patient  Anesthesia:     Anesthesia method:  Local infiltration    Local anesthetic:  Lidocaine 1% w/o epi  Laceration details:     Location:  Finger    Finger location:  L index finger    Length (cm):  0.5  Pre-procedure details:     Preparation:  Imaging obtained to evaluate for foreign bodies  Exploration:     Hemostasis achieved with:  Direct pressure    Imaging obtained: x-ray      Imaging outcome: foreign body not noted      Wound exploration: wound explored through full range of motion and entire depth of wound visualized      Wound extent: no areolar tissue violation noted, no fascia violation noted, no foreign bodies/material noted, no muscle damage noted, no nerve damage noted, no tendon damage noted, no underlying fracture noted and no vascular damage noted      Contaminated: no    Treatment:     Area cleansed with:  Povidone-iodine    Amount of cleaning:  Extensive    Irrigation solution:  Tap water    Irrigation method:  Tap  Skin repair:     Repair method:  Sutures    Suture size:  4-0    Suture material:  Prolene    Suture technique:  Simple interrupted    Number of sutures:  2  Approximation:     Approximation:  Close  Repair type:     Repair type:  Simple  Post-procedure  details:     Dressing:  Antibiotic ointment, non-adherent dressing and splint for protection    Procedure completion:  Tolerated well, no immediate complications      Patient was seen independently    Anjana Cabrera PA-C  Emergency Medicine       Anjana Cabrera PA-C  11/25/24 1776

## 2024-11-26 NOTE — DISCHARGE INSTRUCTIONS
If any warmth, redness, swelling, drainage or signs of infection return to ER  Return in 1 week regardless for suture removal

## 2024-12-03 ENCOUNTER — APPOINTMENT (OUTPATIENT)
Dept: PRIMARY CARE | Facility: CLINIC | Age: 34
End: 2024-12-03
Payer: COMMERCIAL

## 2024-12-03 ENCOUNTER — APPOINTMENT (OUTPATIENT)
Dept: CARDIOLOGY | Facility: HOSPITAL | Age: 34
End: 2024-12-03
Payer: COMMERCIAL

## 2024-12-03 ENCOUNTER — HOSPITAL ENCOUNTER (EMERGENCY)
Facility: HOSPITAL | Age: 34
Discharge: HOME | End: 2024-12-03
Attending: STUDENT IN AN ORGANIZED HEALTH CARE EDUCATION/TRAINING PROGRAM
Payer: COMMERCIAL

## 2024-12-03 VITALS
RESPIRATION RATE: 18 BRPM | BODY MASS INDEX: 27.17 KG/M2 | HEIGHT: 73 IN | SYSTOLIC BLOOD PRESSURE: 149 MMHG | WEIGHT: 205 LBS | OXYGEN SATURATION: 96 % | HEART RATE: 100 BPM | DIASTOLIC BLOOD PRESSURE: 92 MMHG | TEMPERATURE: 98.8 F

## 2024-12-03 DIAGNOSIS — M54.42 ACUTE BILATERAL LOW BACK PAIN WITH BILATERAL SCIATICA: ICD-10-CM

## 2024-12-03 DIAGNOSIS — U07.1 COVID-19: Primary | ICD-10-CM

## 2024-12-03 DIAGNOSIS — M54.41 ACUTE BILATERAL LOW BACK PAIN WITH BILATERAL SCIATICA: ICD-10-CM

## 2024-12-03 DIAGNOSIS — J34.2 DEVIATED NASAL SEPTUM: ICD-10-CM

## 2024-12-03 LAB
APPEARANCE UR: CLEAR
ATRIAL RATE: 115 BPM
BILIRUB UR STRIP.AUTO-MCNC: NEGATIVE MG/DL
COLOR UR: ABNORMAL
FLUAV RNA RESP QL NAA+PROBE: NOT DETECTED
FLUBV RNA RESP QL NAA+PROBE: NOT DETECTED
GLUCOSE UR STRIP.AUTO-MCNC: NORMAL MG/DL
HOLD SPECIMEN: NORMAL
KETONES UR STRIP.AUTO-MCNC: NEGATIVE MG/DL
LEUKOCYTE ESTERASE UR QL STRIP.AUTO: NEGATIVE
MUCOUS THREADS #/AREA URNS AUTO: ABNORMAL /LPF
NITRITE UR QL STRIP.AUTO: NEGATIVE
P AXIS: 42 DEGREES
P OFFSET: 198 MS
P ONSET: 139 MS
PH UR STRIP.AUTO: 5 [PH]
PR INTERVAL: 146 MS
PROT UR STRIP.AUTO-MCNC: NEGATIVE MG/DL
Q ONSET: 212 MS
QRS COUNT: 19 BEATS
QRS DURATION: 98 MS
QT INTERVAL: 326 MS
QTC CALCULATION(BAZETT): 450 MS
QTC FREDERICIA: 404 MS
R AXIS: -12 DEGREES
RBC # UR STRIP.AUTO: ABNORMAL /UL
RBC #/AREA URNS AUTO: ABNORMAL /HPF
SARS-COV-2 RNA RESP QL NAA+PROBE: DETECTED
SP GR UR STRIP.AUTO: 1.01
T AXIS: 50 DEGREES
T OFFSET: 375 MS
UROBILINOGEN UR STRIP.AUTO-MCNC: NORMAL MG/DL
VENTRICULAR RATE: 115 BPM
WBC #/AREA URNS AUTO: ABNORMAL /HPF

## 2024-12-03 PROCEDURE — 87636 SARSCOV2 & INF A&B AMP PRB: CPT | Performed by: STUDENT IN AN ORGANIZED HEALTH CARE EDUCATION/TRAINING PROGRAM

## 2024-12-03 PROCEDURE — 99284 EMERGENCY DEPT VISIT MOD MDM: CPT | Performed by: STUDENT IN AN ORGANIZED HEALTH CARE EDUCATION/TRAINING PROGRAM

## 2024-12-03 PROCEDURE — 96375 TX/PRO/DX INJ NEW DRUG ADDON: CPT

## 2024-12-03 PROCEDURE — 93005 ELECTROCARDIOGRAM TRACING: CPT

## 2024-12-03 PROCEDURE — 96374 THER/PROPH/DIAG INJ IV PUSH: CPT

## 2024-12-03 PROCEDURE — 2500000004 HC RX 250 GENERAL PHARMACY W/ HCPCS (ALT 636 FOR OP/ED): Performed by: STUDENT IN AN ORGANIZED HEALTH CARE EDUCATION/TRAINING PROGRAM

## 2024-12-03 PROCEDURE — 81001 URINALYSIS AUTO W/SCOPE: CPT

## 2024-12-03 PROCEDURE — 2500000001 HC RX 250 WO HCPCS SELF ADMINISTERED DRUGS (ALT 637 FOR MEDICARE OP): Performed by: STUDENT IN AN ORGANIZED HEALTH CARE EDUCATION/TRAINING PROGRAM

## 2024-12-03 PROCEDURE — 2500000005 HC RX 250 GENERAL PHARMACY W/O HCPCS: Performed by: STUDENT IN AN ORGANIZED HEALTH CARE EDUCATION/TRAINING PROGRAM

## 2024-12-03 RX ORDER — FAMOTIDINE 10 MG/ML
20 INJECTION INTRAVENOUS ONCE
Status: COMPLETED | OUTPATIENT
Start: 2024-12-03 | End: 2024-12-03

## 2024-12-03 RX ORDER — WATER
300 LIQUID (ML) MISCELLANEOUS
Status: DISCONTINUED | OUTPATIENT
Start: 2024-12-03 | End: 2024-12-03 | Stop reason: HOSPADM

## 2024-12-03 RX ORDER — LIDOCAINE 560 MG/1
1 PATCH PERCUTANEOUS; TOPICAL; TRANSDERMAL DAILY
Qty: 5 PATCH | Refills: 0 | Status: SHIPPED | OUTPATIENT
Start: 2024-12-03

## 2024-12-03 RX ORDER — METHYLPREDNISOLONE 4 MG/1
TABLET ORAL
Qty: 21 TABLET | Refills: 0 | Status: SHIPPED | OUTPATIENT
Start: 2024-12-03 | End: 2024-12-09

## 2024-12-03 RX ORDER — LIDOCAINE 560 MG/1
1 PATCH PERCUTANEOUS; TOPICAL; TRANSDERMAL ONCE
Status: DISCONTINUED | OUTPATIENT
Start: 2024-12-03 | End: 2024-12-03 | Stop reason: HOSPADM

## 2024-12-03 RX ORDER — ACETAMINOPHEN 325 MG/1
975 TABLET ORAL ONCE
Status: COMPLETED | OUTPATIENT
Start: 2024-12-03 | End: 2024-12-03

## 2024-12-03 RX ORDER — ONDANSETRON HYDROCHLORIDE 2 MG/ML
4 INJECTION, SOLUTION INTRAVENOUS ONCE
Status: COMPLETED | OUTPATIENT
Start: 2024-12-03 | End: 2024-12-03

## 2024-12-03 RX ORDER — METHOCARBAMOL 500 MG/1
1500 TABLET, FILM COATED ORAL ONCE
Status: COMPLETED | OUTPATIENT
Start: 2024-12-03 | End: 2024-12-03

## 2024-12-03 RX ORDER — KETOROLAC TROMETHAMINE 15 MG/ML
15 INJECTION, SOLUTION INTRAMUSCULAR; INTRAVENOUS ONCE
Status: COMPLETED | OUTPATIENT
Start: 2024-12-03 | End: 2024-12-03

## 2024-12-03 ASSESSMENT — LIFESTYLE VARIABLES
EVER FELT BAD OR GUILTY ABOUT YOUR DRINKING: NO
TOTAL SCORE: 0
EVER HAD A DRINK FIRST THING IN THE MORNING TO STEADY YOUR NERVES TO GET RID OF A HANGOVER: NO
HAVE PEOPLE ANNOYED YOU BY CRITICIZING YOUR DRINKING: NO
HAVE YOU EVER FELT YOU SHOULD CUT DOWN ON YOUR DRINKING: NO

## 2024-12-03 ASSESSMENT — PAIN SCALES - GENERAL
PAINLEVEL_OUTOF10: 6
PAINLEVEL_OUTOF10: 0 - NO PAIN
PAINLEVEL_OUTOF10: 10 - WORST POSSIBLE PAIN

## 2024-12-03 ASSESSMENT — PAIN - FUNCTIONAL ASSESSMENT
PAIN_FUNCTIONAL_ASSESSMENT: 0-10
PAIN_FUNCTIONAL_ASSESSMENT: 0-10

## 2024-12-03 NOTE — DISCHARGE INSTRUCTIONS
You have been evaluated in the Emergency Department today for:  Back pain.  You were found to have COVID-19 infection.    Your urine did not show any signs of kidney stones or urine infection    Please follow up with your primary care physician within 1 week and discuss the results of your Emergency Department evaluation with them.    Return to the Emergency Department if you experience any worsening of your symptoms, inability to eat or drink, worsening or new symptoms (difficulty breathing, chest pain, or fever). Please also return if you are not feeling better or have had difficulty following up with an outpatient doctor within one week. You may call of visit the Emergency Department at any time.    Thank you for choosing us for your care.

## 2024-12-03 NOTE — ED PROVIDER NOTES
Emergency Department Provider Note        History of Present Illness     History provided by: Patient  Limitations to History: None  External Records Reviewed with Brief Summary: None    HPI:  Scott Harry is a 34 y.o. male with a PMH of kidney stones, sciatica, and a collapsed disc in his lower back, presents with a chief complaint of persistent lower back pain, sciatica, and vomiting for the past 4 days. He reports difficulty sleeping, managing only 2 hours of sleep per day, and has not eaten much since Thanksgiving. The patient describes the back pain as centralized in the lower back, radiating down both buttocks and legs, accompanied by tingling sensations similar to prior .    Scott has not had a bowel movement in three days, despite attempting to eat. He has been experiencing vomiting, which he attributes to the pain, including an episode upon waking up this morning. The patient has been experiencing fever, with a peak temperature of 100.4°F, and last took Tylenol and Ibuprofen at 9 p.m. the previous night. He has been using topical lidocaine cream for pain relief and reports increased frequency of urination, likely due to increased water intake.    The patient has received cortisone injections in the past for his back issues. He denies any recent heavy lifting or pulling during Thanksgiving. Scott denies any history of high blood pressure, daily medication use, or IV drug use. He has been experiencing coughing for the past 4 days, producing white, foamy, bubbly sputum, as well as runny nose, congestion, and sore throat.    Physical Exam   Triage vitals:  T 36.4 °C (97.5 °F)  HR (!) 122  BP (!) 145/99  RR 18  O2 97 % None (Room air)    General: Awake, alert, in no acute distress  Eyes: Gaze conjugate.  No scleral icterus or injection  HENT: Normo-cephalic, atraumatic. No stridor.  Dry mucous membranes.  CV: Tachycardic rate, regular rhythm. Radial pulses 2+ bilaterally  Resp: Breathing non-labored,  speaking in full sentences.  Clear to auscultation bilaterally  GI: Soft, non-distended. Mild discomfort to palpation throughout. No rebound or guarding.  MSK/Extremities: No gross bony deformities. Moving all extremities. Midline and paraspinal low back pain.  Skin: Warm. Appropriate color  Neuro: Alert. Oriented. Face symmetric. Speech is fluent.  Gross strength and sensation intact in b/l UE and LEs  Psych: Appropriate mood and affect    Medical Decision Making & ED Course   Medical Decision Makin y.o. male evaluated for multiple complaints including acute low back pain with sciatica, gastrointestinal symptoms, upper respiratory symptoms, fever, and increased urinary frequency. The patient has a history of a collapsed disc and has previously received cortisone injections.  He is hypertensive and tachycardic with dry mucous membranes.  Soft mildly tender abdomen with lumbar bilateral paraspinal tenderness.    For the acute low back pain with sciatica, characterized by severe low back pain radiating down both legs with a tingling sensation, the plan includes conservative management with rest, ice, and over-the-counter analgesics (Tylenol and Ibuprofen). Considered CT c-spine however do not suspect fracture or osteomyelitis.  He is not immunosuppressed, not been on steroids recently, no spinal surgeries, and no IV drug use.  Considered MRI L-spine but denies cauda equina symptoms.  He has 5/5 strength of his lower extremities.    Upper respiratory symptoms, described as cough with white, foamy sputum, runny nose, and sore throat for the past four days, will swabbed for flu and COVID testing will be conducted, with a reevaluation planned if symptoms worsen or do not improve.    For the increased urinary frequency, an urinalysis will be obtained to assess for possible infection or other abnormalities. administering Toradol for pain management as needed, and reassessing the patient's condition with consideration  for further diagnostic testing if symptoms do not improve or worsen.  ----  Social Determinants of Health which Significantly Impact Care: None identified     EKG Independent Interpretation:  Per ED course    Independent Result Review and Interpretation: Relevant laboratory and radiographic results were reviewed and independently interpreted by myself.  As necessary, they are commented on in the ED Course.    Chronic conditions affecting the patient's care: None    The patient was discussed with the following consultants/services: None    Care Considerations: As documented above in [facility]    ED Course:  ED Course as of 12/03/24 1014   Tue Dec 03, 2024   0823 Coronavirus 2019, PCR(!): Detected [FN]   0824 Labs are notable for COVID-positive.  Flu negative. [FN]   0824 ECG 12 lead  ECG shows sinus tachycardia.  Vent rate 115.  Normal FL interval.  Narrow QRS.  Normal QT interval.  Normal axis.  No sign of ST segment elevation or depression. [FN]   0835 Coronavirus 2019, PCR(!): Detected  Patient has COVID infection. Symptoms began 4 days ago [ES]   0929 Urinalysis Microscopic(!)  No RBC, bacteria, crystals.  Some WBC in the urine, however given no nitrites or leukocyte esterase lower suspicion for UTI at this time.  Will have patient follow-up in the outpatient setting as  the urine WBCs may be reactive [FN]   0930 Discussed the results with the patient.  He is starting to feel better after getting pain medications.  Will discharge with outpatient primary care follow-up.   [FN]   0945 UA negative for UTI. Trace blood but no RBC's to concern for nephrolithiasis.  Patient updated on findings and plan.  Patient request for ENT follow-up for deviated nasal septum and congestion.  Prescribed lidocaine patches and Medrol Dosepak for worsening sciatica.  Strict return precaution provided. [ES]      ED Course User Index  [ES] Lizandro Lewis MD  [FN] Andrei Cuevas MD         Diagnoses as of 12/03/24 1014   COVID-19   Deviated nasal  septum   Acute bilateral low back pain with bilateral sciatica     Disposition   As a result of the work-up, the patient was discharged home.  he was informed of his diagnosis and instructed to come back with any concerns or worsening of condition.  he and was agreeable to the plan as discussed above.  he was given the opportunity to ask questions.  All of the patient's questions were answered.    Procedures   Procedures    This was a shared visit with an ED attending.  The patient was seen and discussed with the ED attending    Lizandro Lewis MD  Emergency Medicine     Lizandro Lewis MD  Resident  12/03/24 1019

## 2024-12-03 NOTE — PROGRESS NOTES
Emergency Medicine Attending Attestation Note    Scott Harry is a 34 y.o. male who has hx of sciatica and kidney stone presenting with not feeling well for few days  Having fevers at home, sore throat, cough congestion x 4 d. Wife w same sx  Highest fever at home 100.4 F yesterday  After his URI symptoms started he started to have a flareup of his sciatica pain.  On the left low back.  Similar to prior episodes of sciatica and nephrolithiasis.  Having nausea and vomiting yesterday.  Feeling nauseous due to the pain.  Hx of steroids injections in the past many yr ago for back pain. No recent trauma or instrumentation.  No IVDU, trauma, saddle anesthesia, numbness, weakness  Feels constipated but passing gas  No chest pain, shortness of breath, abdominal pain, hematuria, dysuria, urgency, diarrhea      Exam  Visit Vitals  BP (!) 145/99   Pulse (!) 122   Temp 36.4 °C (97.5 °F)   Resp 18   Afebrile, NAD, tachycardic, dry mucous membranes  Patent airway, FROM neck, clear oropharynx, no hoarse voice, no tonsillar exudates.  CTAB, tachycardia, regular rhythm  Reproducible bilateral L-spine paraspinal tenderness.  Ambulatory with stable gait. SILT 4 ext, 5/5 strength in 4 ext  No CVA tenderness bilaterally.  No abdominal tenderness, no rebound, guarding      MDM  34-year-old male history of sciatica, nephrolithiasis presenting with URI symptoms and back pain.  URI symptoms started first and likely are the source of his fevers, cough, congestion.  He does not have any shortness of breath, chest pain.  Clear lungs on exam.  Not suspicious for pneumonia.  Does not require chest x-ray at this time.  Did consider PE evaluation due to tachycardia however given that patient does not have any shortness of breath, chest pain, hemoptysis, leg swelling/pain, recent travel there is no clinical suspicion for PE at this time.  Will evaluate for COVID/flu given concern for URI    Patient's back pain is likely MSK in nature.  He does  not have any step-offs or deformities concerning for fracture.  He does not have any history of instrumentation, immunocompromise status, IV drug use and therefore spinal infection is not suspected.  He has normal neurological exam therefore cauda equina and cord compression are not consistent with his presentation.  This is likely a flareup of his sciatica pain in the setting of his recent illness.  Will give pain control and reassess.  Also get screening UA to evaluate for hematuria/nephrolithiasis/UTI.  However low suspicion for these pathologies      MDM Complexity of Problems Addressed    Complexity of problems addressed  The patient has an acute or chronic illness/injury that poses a possible threat to life or bodily function: FEVER, COLD SX, BACK PAIN    Data Reviewed:  I reviewed or ordered at least three unique tests, external notes, and/or the history required an independent historian as following: Urology visit 8/29/2023, [place] ED visit 7/14/2022, primary care patient message 11/14/2024, office visit 9/1/2023 and I independently interpreted the following test: COVID-positive, see ED course in the ED provider note    Chronic conditions affecting the patient's care: Sciatica, nephrolithiasis  Care considerations: See MDM for discussion regarding evaluation for PE.  Did consider spinal imaging given back pain concern however his symptoms are not concerning with spinal fracture, infection, cord compression, cauda equina.  For this reason although MRI/CT were considered they were not pursued due to low clinical diagnostic benefit given the patient's clinical picture    This patient has a high risk of morbidity due to further diagnostic testing or treatment:  Drug therapy requiring extensive monitoring for toxicity and Parenteral controlled substances          Andrei Cuevas MD  Emergency Medicine